# Patient Record
Sex: FEMALE | Race: WHITE
[De-identification: names, ages, dates, MRNs, and addresses within clinical notes are randomized per-mention and may not be internally consistent; named-entity substitution may affect disease eponyms.]

---

## 2017-03-24 ENCOUNTER — HOSPITAL ENCOUNTER (EMERGENCY)
Age: 32
LOS: 1 days | Discharge: HOME | End: 2017-03-25
Payer: COMMERCIAL

## 2017-03-24 DIAGNOSIS — T50.905A: ICD-10-CM

## 2017-03-24 DIAGNOSIS — L27.0: Primary | ICD-10-CM

## 2018-02-23 ENCOUNTER — HOSPITAL ENCOUNTER (EMERGENCY)
Dept: HOSPITAL 76 - ED | Age: 33
Discharge: HOME | End: 2018-02-23
Payer: COMMERCIAL

## 2018-02-23 VITALS — SYSTOLIC BLOOD PRESSURE: 113 MMHG | DIASTOLIC BLOOD PRESSURE: 66 MMHG

## 2018-02-23 DIAGNOSIS — Z3A.11: ICD-10-CM

## 2018-02-23 DIAGNOSIS — R10.30: ICD-10-CM

## 2018-02-23 DIAGNOSIS — O26.891: Primary | ICD-10-CM

## 2018-02-23 LAB
CLARITY UR REFRACT.AUTO: CLEAR
GLUCOSE UR QL STRIP.AUTO: NEGATIVE MG/DL
KETONES UR QL STRIP.AUTO: 15 MG/DL
NITRITE UR QL STRIP.AUTO: NEGATIVE
PH UR STRIP.AUTO: 7 PH (ref 5–7.5)
PROT UR STRIP.AUTO-MCNC: NEGATIVE MG/DL
RBC # UR STRIP.AUTO: NEGATIVE /UL
SP GR UR STRIP.AUTO: 1.02 (ref 1–1.03)
UROBILINOGEN UR QL STRIP.AUTO: (no result) E.U./DL
UROBILINOGEN UR STRIP.AUTO-MCNC: NEGATIVE MG/DL

## 2018-02-23 PROCEDURE — 87086 URINE CULTURE/COLONY COUNT: CPT

## 2018-02-23 PROCEDURE — 36415 COLL VENOUS BLD VENIPUNCTURE: CPT

## 2018-02-23 PROCEDURE — 86901 BLOOD TYPING SEROLOGIC RH(D): CPT

## 2018-02-23 PROCEDURE — 81001 URINALYSIS AUTO W/SCOPE: CPT

## 2018-02-23 PROCEDURE — 81003 URINALYSIS AUTO W/O SCOPE: CPT

## 2018-02-23 PROCEDURE — 99283 EMERGENCY DEPT VISIT LOW MDM: CPT

## 2018-02-23 PROCEDURE — 86900 BLOOD TYPING SEROLOGIC ABO: CPT

## 2018-02-23 NOTE — ED PHYSICIAN DOCUMENTATION
PD HPI ABD PAIN





- Stated complaint


Stated Complaint: ABD CRAMPING/11 WEEKS PREG





- Chief complaint


Chief Complaint: Abd Pain





- History obtained from


History obtained from: Patient





- History of Present Illness


Timing - onset: Other ( at 11 weeks gestation with slight vaginal bleeding 

and cramping today and a lot of anxiety over potential miscarriage.  Blood type 

is unknown.)





Review of Systems


Constitutional: denies: Fever, Chills


GI: reports: Abdominal Pain.  denies: Nausea, Vomiting, Diarrhea


: denies: Dysuria, Frequency





PD PAST MEDICAL HISTORY





- Past Medical History


Cardiovascular: None


Respiratory: None


Neuro: None


Endocrine/Autoimmune: None


GI: None


GYN: None


: None


HEENT: None


Psych: None


Musculoskeletal: None


Derm: None





- Past Surgical History


Past Surgical History: No





- Present Medications


Home Medications: 


 Ambulatory Orders











 Medication  Instructions  Recorded  Confirmed


 


[id number]/FA/Omega3/Dha/Fish Oil  18 





[Prenatal Gummies]   














- Allergies


Allergies/Adverse Reactions: 


 Allergies











Allergy/AdvReac Type Severity Reaction Status Date / Time


 


Sulfa (Sulfonamide Allergy  Hives Verified 18 17:20





Antibiotics)     


 


JOE AdvReac  Rash Uncoded 18 17:20


 


BENZOIL PEROXIDE AdvReac  Unknown Uncoded 18 17:20














- Social History


Does the pt smoke?: No


Smoking Status: Never smoker


Does the pt drink ETOH?: No


Does the pt have substance abuse?: No





- Immunizations


Immunizations are current?: Yes





- POLST


Patient has POLST: No





PD ED PE NORMAL





- Vitals


Vital signs reviewed: Yes





- General


General: Alert and oriented X 3, No acute distress





- Abdomen


Abdomen: Soft, Non tender





- Female 


Female : Other (Bedside ultrasound demonstrates single live intrauterine 

pregnancy with heart rate of 150.)





- Neuro


Neuro: Alert and oriented X 3, Normal speech





- Psych


Psych: Normal mood, Normal affect





Results





- Vitals


Vitals: 


 Vital Signs - 24 hr











  18





  17:16


 


Temperature 36.8 C


 


Heart Rate 74


 


Respiratory 20





Rate 


 


Blood Pressure 113/66


 


O2 Saturation 100








 Oxygen











O2 Source                      Room air

















- Labs


Labs: 


 Laboratory Tests











  18





  17:41 18:00


 


Urine Color   YELLOW


 


Urine Clarity   CLEAR


 


Urine pH   7.0


 


Ur Specific Gravity   1.020


 


Urine Protein   NEGATIVE


 


Urine Glucose (UA)   NEGATIVE


 


Urine Ketones   15 H


 


Urine Occult Blood   NEGATIVE


 


Urine Nitrite   NEGATIVE


 


Urine Bilirubin   NEGATIVE


 


Urine Urobilinogen   0.2 (NORMAL)


 


Ur Leukocyte Esterase   NEGATIVE


 


Ur Microscopic Review   NOT INDICATED


 


Urine Culture Comments   NOT INDICATED


 


Blood Type  A POSITIVE 














- Rads (name of study)


  ** Bedside pelvic sono


Radiology: EMP read indepedently (single live iup)





Departure





- Departure


Disposition: 01 Home, Self Care


Clinical Impression: 


Abdominal pain


Qualifiers:


 Abdominal location: lower abdomen, unspecified Qualified Code(s): R10.30 - 

Lower abdominal pain, unspecified





Pregnancy


Qualifiers:


 Weeks of gestation: 11 weeks Qualified Code(s): Z3A.11 - 11 weeks gestation of 

pregnancy





Condition: Good


Record reviewed to determine appropriate education?: Yes


Instructions:  ED Preg Established Normal Sxs


Comments: 


If pain worsens or if bleeding becomes heavy please return for reevaluation.  

Be reassured that your ultrasound at this point shows that the baby looks okay 

with a normal heart rate.  Follow-up with your OB.


Discharge Date/Time: 18 18:53

## 2018-08-20 ENCOUNTER — HOSPITAL ENCOUNTER (OUTPATIENT)
Dept: HOSPITAL 76 - WFO | Age: 33
Discharge: HOME | End: 2018-08-20
Attending: OBSTETRICS & GYNECOLOGY
Payer: COMMERCIAL

## 2018-08-20 VITALS — DIASTOLIC BLOOD PRESSURE: 55 MMHG | SYSTOLIC BLOOD PRESSURE: 110 MMHG

## 2018-08-20 DIAGNOSIS — O9A.213: Primary | ICD-10-CM
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
## 2018-08-20 DIAGNOSIS — Z3A.35: ICD-10-CM

## 2018-08-20 PROCEDURE — 99212 OFFICE O/P EST SF 10 MIN: CPT

## 2018-08-21 NOTE — PREOP HISTORY & PHYSICAL
DATE OF SERVICE: 2018

Physician: Jose Juan Patel MD

 

PATIENT IDENTIFICATION:  The patient is a 33-year-old G3, P2 female who is 35.6 weeks EGA.  She is a 
patient taken care of at [facility].

 

CHIEF COMPLAINT:  Fall.

 

HISTORY OF PRESENT ILLNESS:  The patient states that this evening she fell on her right knee.  She de
nies impacting on the uterus.  She denies any bleeding, cramping.  She denies any trauma at this poin
t.  She denies loss of fluid.  She relates her pregnancy has been unremarkable up and to this point. 
 She is noted to be A positive.  She is also noted to have a 50 gram Glucola of 150.  She has not pur
sued her 3-hour GTT and she was strongly encouraged to do this.

 

PAST MEDICAL HISTORY:  None.

 

PAST SURGICAL HISTORY:   section x2.

 

ALLERGIES:  TO SULFA.

 

MEDICATIONS:  Prenatal vitamins.

 

HABITS:  The patient denies use of alcohol, tobacco, street or addictive drugs or tetrahydrocannabino
l.

 

SOCIAL HISTORY:  The patient is  and lives with her spouse, who is [employer].

 

PHYSICAL EXAMINATION:  The uterus is soft throughout.  It is gravid.  There is no evidence of any ten
derness on the uterus.  There is some tenderness of the right knee as well as the right hip, particul
gladis SI joint.  She has been on the monitor for the last hour.  There is evidence of a beautiful reac
tive strip.  There is no evidence of any cramping at this particular time.  

 

IMPRESSION:  A 33-year-old G3, P2, at 35 and 6 who has recently had a fall.  She appears to have no s
equelae at this particular time.

 

PLAN:  The patient is encouraged to follow up with her OB doctor tomorrow back at [facility].  She is also
 strongly encouraged to do her 3-hour GTT.  Should she develop contractions, bleeding or decreased fe
adrian motion, she should report this immediately.

 

 

DD: 2018 21:25

TD: 2018 21:29

Job #: [id number]

## 2020-03-18 ENCOUNTER — HOSPITAL ENCOUNTER (EMERGENCY)
Dept: HOSPITAL 76 - ED | Age: 35
Discharge: HOME | End: 2020-03-18
Payer: COMMERCIAL

## 2020-03-18 VITALS — SYSTOLIC BLOOD PRESSURE: 138 MMHG | DIASTOLIC BLOOD PRESSURE: 84 MMHG

## 2020-03-18 DIAGNOSIS — F43.0: Primary | ICD-10-CM

## 2020-03-18 DIAGNOSIS — F41.9: ICD-10-CM

## 2020-03-18 PROCEDURE — 99284 EMERGENCY DEPT VISIT MOD MDM: CPT

## 2020-03-18 PROCEDURE — 99282 EMERGENCY DEPT VISIT SF MDM: CPT

## 2020-03-18 NOTE — ED PHYSICIAN DOCUMENTATION
History of Present Illness





- Stated complaint


Stated Complaint: ANXIETY





- Chief complaint


Chief Complaint: MHE





- History obtained from


History obtained from: Patient (This pleasant 34-year-old female comes in today 

with chief complaint of having anxiety.  Started about 2 to 3 weeks ago, with 

her father sustained a myocardial infarction, went in to have full cardiac 

stents placed.  Since that time the patient has noted that she has had daily 

anxiety, panic attacks occurring 1-2 times a day. She has feelings of panic, 

impending doom when these incidences occur She has not taken any medications for

this at this time.  This is occurred to her twice before in the past.  Both 

times were in the past when she was weaned her children off of breast-feeding.  

She was on Zoloft a year ago, had some bad side effects and that so she was 

switched over to Effexor which she eventually weaned herself off of roughly a 

year ago.  She has been fine off of the Effexor up until this most recent 

incidence.  She did call her primary care physician to get an appointment, but 

that is not until April 3.  She denies any suicidal ideation, or homicidal 

ideations.)





Review of Systems


Constitutional: reports: Reviewed and negative


Eyes: reports: Reviewed and negative


Ears: reports: Reviewed and negative


Nose: reports: Reviewed and negative


Throat: reports: Reviewed and negative


Cardiac: reports: Reviewed and negative


Respiratory: reports: Reviewed and negative


GI: reports: Reviewed and negative


: reports: Reviewed and negative


Neurologic: reports: Reviewed and negative


Psychiatric: reports: Anxiety, Other (Panic)





PD PAST MEDICAL HISTORY





- Past Medical History


Past Medical History: Yes


Cardiovascular: None


Respiratory: None


Endocrine/Autoimmune: None


GI: None


GYN: None


: None


HEENT: None


Psych: Depression, Anxiety, Panic attacks


Musculoskeletal: None


Derm: None





- Past Surgical History


Past Surgical History: No





- Present Medications


Home Medications: 


                                Ambulatory Orders











 Medication  Instructions  Recorded  Confirmed


 


Pnv No.103/Folic/Om3s/Fish Oil  02/23/18 





[Prenatal Gummies]   


 


LORazepam [Lorazepam] 0.5 mg PO BID PRN #12 tablet 03/18/20 














- Allergies


Allergies/Adverse Reactions: 


                                    Allergies











Allergy/AdvReac Type Severity Reaction Status Date / Time


 


Sulfa (Sulfonamide Allergy  Hives Verified 03/18/20 20:07





Antibiotics)     


 


JOE AdvReac  Rash Uncoded 03/18/20 20:07


 


BENZOIL PEROXIDE AdvReac  Unknown Uncoded 03/18/20 20:07














- Social History


Does the pt smoke?: No


Smoking Status: Never smoker


Does the pt drink ETOH?: No


Does the pt have substance abuse?: No





- Immunizations


Immunizations are current?: Yes





- POLST


Patient has POLST: No





PD ED PE NORMAL





- General


General: Alert and oriented X 3, No acute distress, Well developed/nourished





- HEENT


HEENT: Atraumatic, PERRL, EOMI, Moist mucous membranes





- Neck


Neck: No adenopathy





- Cardiac


Cardiac: RRR, No murmur





- Respiratory


Respiratory: No respiratory distress, Clear bilaterally





Results





- Vitals


Vitals: 





                               Vital Signs - 24 hr











  03/18/20 03/18/20





  20:07 20:41


 


Temperature 36.5 C 


 


Heart Rate 98 98


 


Respiratory 14 14





Rate  


 


Blood Pressure 142/87 H 142/87 H


 


O2 Saturation 100 100








                                     Oxygen











O2 Source                      Room air

















PD MEDICAL DECISION MAKING





- ED course


Complexity details: re-evaluated patient, d/w patient





Departure





- Departure


Disposition: 01 Home, Self Care


Clinical Impression: 


 Anxiety, Panic attack as reaction to stress





Condition: Good


Instructions:  ED Panic Attack


Prescriptions: 


LORazepam [Lorazepam] 0.5 mg PO BID PRN #12 tablet


 PRN Reason: Anxiety


Comments: 


As you and I discussed this evening, i will send you home with two lorazepam.  

When you get home take 1 tablet and go to bed.  If your anxiety is still present

 one to 2 hours later you may take a second pill.  I will also provide you with 

a prescription for lorazepam 1 mg tablets that you can take once a day with 

panic attacks.  Keep your appointment with your new PCP on March 3.Do not drink 

alcohol or operate machinery, drive a vehicle, or use power tools while taking 

lorazepam.